# Patient Record
Sex: FEMALE | NOT HISPANIC OR LATINO | URBAN - METROPOLITAN AREA
[De-identification: names, ages, dates, MRNs, and addresses within clinical notes are randomized per-mention and may not be internally consistent; named-entity substitution may affect disease eponyms.]

---

## 2021-12-16 ENCOUNTER — IMPORTED ENCOUNTER (OUTPATIENT)
Dept: URBAN - METROPOLITAN AREA CLINIC 31 | Facility: CLINIC | Age: 81
End: 2021-12-16

## 2021-12-16 PROBLEM — H40.1131: Noted: 2021-12-16

## 2021-12-16 PROBLEM — Z96.1: Noted: 2021-12-16

## 2021-12-16 PROCEDURE — 99204 OFFICE O/P NEW MOD 45 MIN: CPT

## 2021-12-16 PROCEDURE — 92015 DETERMINE REFRACTIVE STATE: CPT

## 2021-12-16 NOTE — PATIENT DISCUSSION
1.  Doing well with MFIOL. Monitor for changes in vision. 2. Primary open angle glaucoma OU mild - Acceptable IOPs. Was being followed by Dr. Ana Caldera. Continue with current treatment plan. Discussed importance of compliance. Will continue to monitor for stability or progression. 3. Return for an appointment in 6 months for comprehensive exam and VF 24-2. with Dr. Carlos Bess.

## 2022-01-25 ENCOUNTER — IMPORTED ENCOUNTER (OUTPATIENT)
Dept: URBAN - METROPOLITAN AREA CLINIC 31 | Facility: CLINIC | Age: 82
End: 2022-01-25

## 2022-01-25 PROBLEM — H40.1131: Noted: 2022-01-25

## 2022-01-25 PROBLEM — Z96.1: Noted: 2022-01-25

## 2022-01-25 PROBLEM — H02.052: Noted: 2022-01-25

## 2022-01-25 PROBLEM — H02.053: Noted: 2022-01-25

## 2022-01-25 PROCEDURE — 99214 OFFICE O/P EST MOD 30 MIN: CPT

## 2022-01-25 PROCEDURE — 67820 REVISE EYELASHES: CPT

## 2022-01-25 NOTE — PATIENT DISCUSSION
1.  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 Epilation with  forceps was recommended. AT gel 3-4XD. 2. Doing well with MFIOL. Monitor for changes in vision. 3. Primary open angle glaucoma OU mild - Acceptable IOPs. Was being followed by Dr. Ana Caldera. Continue with current treatment plan. Discussed importance of compliance. Will continue to monitor for stability or progression. 4. Return for an appointment in 6 months for comprehensive exam and VF 24-2. with Dr. Carlos Bess.

## 2022-01-25 NOTE — PATIENT DISCUSSION
1.  Doing well with MFIOL. Monitor for changes in vision. 2. Primary open angle glaucoma OU mild - Acceptable IOPs. Was being followed by Dr. John Rodriguez. Continue with current treatment plan. Discussed importance of compliance. Will continue to monitor for stability or progression. 3. Return for an appointment in 6 months for comprehensive exam and VF 24-2. with Dr. Karina Amezcua.

## 2022-04-02 ASSESSMENT — VISUAL ACUITY
OS_SC: 20/25
OS_PH: SC 20/30 +2
OD_GLARE: 20/400MED
OD_PH: SC 20/25 -3
OS_GLARE: 20/800MED
OS_CC: 20/40+1
OD_SC: 20/25
OD_CC: 20/80+1
OD_SC: 20/20-1
OS_SC: 20/20-1

## 2022-04-02 ASSESSMENT — TONOMETRY
OD_IOP_MMHG: 20
OD_IOP_MMHG: 18
OS_IOP_MMHG: 17
OS_IOP_MMHG: 17

## 2022-04-06 NOTE — PATIENT DISCUSSION
Discussed the importance of blood sugar control in the prevention of ocular complications. Reason for Call:  Other call back    Detailed comments: Pt stated that she has needed HA for a long time and now she wants them.  Pt wants to get it ASAP and states that it is essential. Please call pt and let her know if she is essential or not.   Phone Number Patient can be reached at: Home number on file [phone] (home)    Best Time: NA    Can we leave a detailed message on this number? YES    Call taken on 5/20/2020 at 10:42 AM by Michelle Mendez

## 2022-06-16 ENCOUNTER — COMPREHENSIVE EXAM (OUTPATIENT)
Dept: URBAN - METROPOLITAN AREA CLINIC 29 | Facility: CLINIC | Age: 82
End: 2022-06-16

## 2022-06-16 DIAGNOSIS — H40.1131: ICD-10-CM

## 2022-06-16 PROCEDURE — 92083 EXTENDED VISUAL FIELD XM: CPT

## 2022-06-16 PROCEDURE — 99214 OFFICE O/P EST MOD 30 MIN: CPT

## 2022-06-16 ASSESSMENT — TONOMETRY
OD_IOP_MMHG: 12
OS_IOP_MMHG: 13

## 2022-06-16 ASSESSMENT — VISUAL ACUITY
OD_CC: 20/25
OS_CC: 20/25

## 2022-06-16 NOTE — PATIENT DISCUSSION
1.  Doing well with MFIOL. Monitor for changes in vision. 2. Primary open angle glaucoma OU mild - Acceptable IOPs. Was being followed by Dr. Mojgan Liao. Continue with current treatment plan. Discussed importance of compliance. Will continue to monitor for stability or progression. 3. Return for an appointment in 6 months for comprehensive exam and VF 24-2. with Dr. Gage Gonzalez.

## 2022-06-16 NOTE — PATIENT DISCUSSION
1.  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 Epilation with  forceps was recommended. AT gel 3-4XD. 2. Doing well with MFIOL. Monitor for changes in vision. 3. Primary open angle glaucoma OU mild - Acceptable IOPs. Was being followed by Dr. Andi Delaney. Continue with current treatment plan. Discussed importance of compliance. Will continue to monitor for stability or progression. 4. Return for an appointment in 6 months for comprehensive exam and VF 24-2. with Dr. Raven Rea.

## 2022-06-16 NOTE — PATIENT DISCUSSION
Procedure note: Informed consent obtained. Abnormal lashes epilated with forceps without difficulty.

## 2022-08-09 ENCOUNTER — EMERGENCY VISIT (OUTPATIENT)
Dept: URBAN - METROPOLITAN AREA CLINIC 29 | Facility: CLINIC | Age: 82
End: 2022-08-09

## 2022-08-09 DIAGNOSIS — H04.123: ICD-10-CM

## 2022-08-09 DIAGNOSIS — H16.122: ICD-10-CM

## 2022-08-09 PROCEDURE — 99214 OFFICE O/P EST MOD 30 MIN: CPT

## 2022-08-09 RX ORDER — LOTEPREDNOL ETABONATE 3.8 MG/G: 1 GEL OPHTHALMIC

## 2022-08-09 ASSESSMENT — VISUAL ACUITY
OS_CC: 20/25
OD_CC: 20/25

## 2022-08-17 ENCOUNTER — ESTABLISHED PATIENT (OUTPATIENT)
Dept: URBAN - METROPOLITAN AREA CLINIC 29 | Facility: CLINIC | Age: 82
End: 2022-08-17

## 2022-08-17 DIAGNOSIS — H04.123: ICD-10-CM

## 2022-08-17 DIAGNOSIS — H40.1131: ICD-10-CM

## 2022-08-17 DIAGNOSIS — H16.122: ICD-10-CM

## 2022-08-17 PROCEDURE — 99213 OFFICE O/P EST LOW 20 MIN: CPT

## 2022-08-17 ASSESSMENT — VISUAL ACUITY
OD_CC: 20/25
OS_CC: 20/25

## 2022-08-17 ASSESSMENT — TONOMETRY
OS_IOP_MMHG: 16
OD_IOP_MMHG: 21

## 2022-08-27 NOTE — PATIENT DISCUSSION
The IOP is above the target range OD. Check IOP 6 weeks. May be steroid response. Pf[employer] dose 1 and 2

## 2022-10-18 ENCOUNTER — ESTABLISHED PATIENT (OUTPATIENT)
Dept: URBAN - METROPOLITAN AREA CLINIC 29 | Facility: CLINIC | Age: 82
End: 2022-10-18

## 2022-10-18 DIAGNOSIS — H04.123: ICD-10-CM

## 2022-10-18 DIAGNOSIS — H40.1131: ICD-10-CM

## 2022-10-18 PROCEDURE — 99213 OFFICE O/P EST LOW 20 MIN: CPT

## 2022-10-18 ASSESSMENT — TONOMETRY
OS_IOP_MMHG: 12
OD_IOP_MMHG: 18

## 2022-10-18 ASSESSMENT — VISUAL ACUITY
OS_CC: 20/25+1
OD_CC: 20/20-2

## 2022-11-18 ENCOUNTER — ESTABLISHED PATIENT (OUTPATIENT)
Dept: URBAN - METROPOLITAN AREA CLINIC 29 | Facility: CLINIC | Age: 82
End: 2022-11-18

## 2022-11-18 DIAGNOSIS — H40.1131: ICD-10-CM

## 2022-11-18 DIAGNOSIS — H04.123: ICD-10-CM

## 2022-11-18 DIAGNOSIS — H16.122: ICD-10-CM

## 2022-11-18 PROCEDURE — 99213 OFFICE O/P EST LOW 20 MIN: CPT

## 2022-11-18 ASSESSMENT — VISUAL ACUITY
OD_CC: 20/20
OS_CC: 20/25

## 2022-11-18 ASSESSMENT — TONOMETRY
OD_IOP_MMHG: 18
OS_IOP_MMHG: 12

## 2022-11-18 NOTE — PATIENT DISCUSSION
Continue Zioptan HS OU. Discontinue Alphagan to see if responsible for some surface issues. Check IOP in 2-3 weeks. If IOP increases but surface improves, try Vyzulta or consider SLT.

## 2022-12-12 ENCOUNTER — ESTABLISHED PATIENT (OUTPATIENT)
Dept: URBAN - METROPOLITAN AREA CLINIC 29 | Facility: CLINIC | Age: 82
End: 2022-12-12

## 2022-12-12 PROCEDURE — 99214 OFFICE O/P EST MOD 30 MIN: CPT

## 2022-12-12 ASSESSMENT — VISUAL ACUITY
OD_CC: 20/25
OS_CC: 20/25

## 2022-12-12 ASSESSMENT — TONOMETRY
OS_IOP_MMHG: 16
OD_IOP_MMHG: 28

## 2022-12-20 ENCOUNTER — ESTABLISHED PATIENT (OUTPATIENT)
Dept: URBAN - METROPOLITAN AREA CLINIC 29 | Facility: CLINIC | Age: 82
End: 2022-12-20

## 2022-12-20 PROCEDURE — 99213 OFFICE O/P EST LOW 20 MIN: CPT

## 2022-12-20 ASSESSMENT — TONOMETRY
OS_IOP_MMHG: 16
OD_IOP_MMHG: 20

## 2022-12-20 ASSESSMENT — VISUAL ACUITY
OS_SC: 20/40+2
OD_SC: 20/70-2
OD_PH: 20/30-2

## 2023-09-21 ENCOUNTER — FOLLOW UP (OUTPATIENT)
Dept: URBAN - METROPOLITAN AREA CLINIC 29 | Facility: CLINIC | Age: 83
End: 2023-09-21

## 2023-09-21 DIAGNOSIS — H40.1131: ICD-10-CM

## 2023-09-21 PROCEDURE — 99214 OFFICE O/P EST MOD 30 MIN: CPT

## 2023-09-21 PROCEDURE — 92133 CPTRZD OPH DX IMG PST SGM ON: CPT

## 2023-09-21 ASSESSMENT — TONOMETRY
OD_IOP_MMHG: 23
OS_IOP_MMHG: 16

## 2023-09-21 ASSESSMENT — VISUAL ACUITY
OS_SC: 20/25-2
OD_SC: 20/30

## 2023-10-10 ENCOUNTER — FOLLOW UP (OUTPATIENT)
Dept: URBAN - METROPOLITAN AREA CLINIC 29 | Facility: CLINIC | Age: 83
End: 2023-10-10

## 2023-10-10 DIAGNOSIS — H40.1131: ICD-10-CM

## 2023-10-10 DIAGNOSIS — H01.113: ICD-10-CM

## 2023-10-10 DIAGNOSIS — H01.116: ICD-10-CM

## 2023-10-10 DIAGNOSIS — H04.123: ICD-10-CM

## 2023-10-10 PROCEDURE — 99214 OFFICE O/P EST MOD 30 MIN: CPT

## 2023-10-10 ASSESSMENT — VISUAL ACUITY
OD_CC: 20/30-2
OS_CC: 20/30

## 2023-10-10 ASSESSMENT — TONOMETRY
OS_IOP_MMHG: 17
OD_IOP_MMHG: 20

## 2023-10-31 ENCOUNTER — FOLLOW UP (OUTPATIENT)
Dept: URBAN - METROPOLITAN AREA CLINIC 29 | Facility: CLINIC | Age: 83
End: 2023-10-31

## 2023-10-31 DIAGNOSIS — H01.113: ICD-10-CM

## 2023-10-31 DIAGNOSIS — H01.116: ICD-10-CM

## 2023-10-31 DIAGNOSIS — H40.1131: ICD-10-CM

## 2023-10-31 PROCEDURE — 99213 OFFICE O/P EST LOW 20 MIN: CPT

## 2023-10-31 ASSESSMENT — TONOMETRY
OD_IOP_MMHG: 22
OS_IOP_MMHG: 16

## 2023-10-31 ASSESSMENT — VISUAL ACUITY
OS_CC: 20/20
OD_CC: 20/40
OD_PH: 20/30

## 2023-12-13 ENCOUNTER — FOLLOW UP (OUTPATIENT)
Dept: URBAN - METROPOLITAN AREA CLINIC 29 | Facility: CLINIC | Age: 83
End: 2023-12-13

## 2023-12-13 DIAGNOSIS — H40.1131: ICD-10-CM

## 2023-12-13 PROCEDURE — 99213 OFFICE O/P EST LOW 20 MIN: CPT

## 2023-12-13 ASSESSMENT — TONOMETRY
OD_IOP_MMHG: 22
OS_IOP_MMHG: 16

## 2023-12-13 ASSESSMENT — VISUAL ACUITY
OS_CC: 20/25-1
OD_CC: 20/30+2

## 2024-02-09 ENCOUNTER — CONSULTATION/EVALUATION (OUTPATIENT)
Dept: URBAN - METROPOLITAN AREA CLINIC 29 | Facility: CLINIC | Age: 84
End: 2024-02-09

## 2024-02-09 DIAGNOSIS — H40.1112: ICD-10-CM

## 2024-02-09 DIAGNOSIS — H43.811: ICD-10-CM

## 2024-02-09 DIAGNOSIS — H04.123: ICD-10-CM

## 2024-02-09 DIAGNOSIS — H40.1121: ICD-10-CM

## 2024-02-09 DIAGNOSIS — Z96.1: ICD-10-CM

## 2024-02-09 DIAGNOSIS — G45.3: ICD-10-CM

## 2024-02-09 DIAGNOSIS — H02.051: ICD-10-CM

## 2024-02-09 PROCEDURE — 99214 OFFICE O/P EST MOD 30 MIN: CPT

## 2024-02-09 PROCEDURE — 92083 EXTENDED VISUAL FIELD XM: CPT

## 2024-02-09 PROCEDURE — 76514 ECHO EXAM OF EYE THICKNESS: CPT

## 2024-02-09 ASSESSMENT — PACHYMETRY
OS_CT_UM: 505
OD_CT_UM: 511

## 2024-02-09 ASSESSMENT — VISUAL ACUITY
OD_CC: 20/30
OS_CC: 20/25-2

## 2024-02-09 ASSESSMENT — TONOMETRY
OS_IOP_MMHG: 18
OD_IOP_MMHG: 29

## 2024-02-20 ENCOUNTER — SURGERY/PROCEDURE (OUTPATIENT)
Dept: URBAN - METROPOLITAN AREA SURGERY 17 | Facility: SURGERY | Age: 84
End: 2024-02-20

## 2024-02-20 DIAGNOSIS — H40.1112: ICD-10-CM

## 2024-02-20 PROCEDURE — 65855 TRABECULOPLASTY LASER SURG: CPT

## 2024-03-04 ENCOUNTER — ESTABLISHED PATIENT (OUTPATIENT)
Dept: URBAN - METROPOLITAN AREA CLINIC 29 | Facility: CLINIC | Age: 84
End: 2024-03-04

## 2024-03-04 DIAGNOSIS — H40.1112: ICD-10-CM

## 2024-03-04 DIAGNOSIS — H40.1121: ICD-10-CM

## 2024-03-04 DIAGNOSIS — Z98.890: ICD-10-CM

## 2024-03-04 PROCEDURE — 92012 INTRM OPH EXAM EST PATIENT: CPT

## 2024-03-04 ASSESSMENT — VISUAL ACUITY
OD_CC: 20/40+1
OD_PH: 20/30
OS_CC: 20/30+2

## 2024-03-04 ASSESSMENT — TONOMETRY
OD_IOP_MMHG: 34
OS_IOP_MMHG: 18

## 2024-04-05 ENCOUNTER — FOLLOW UP (OUTPATIENT)
Dept: URBAN - METROPOLITAN AREA CLINIC 29 | Facility: CLINIC | Age: 84
End: 2024-04-05

## 2024-04-05 DIAGNOSIS — H40.1121: ICD-10-CM

## 2024-04-05 DIAGNOSIS — H40.1112: ICD-10-CM

## 2024-04-05 DIAGNOSIS — Z98.890: ICD-10-CM

## 2024-04-05 PROCEDURE — 99213 OFFICE O/P EST LOW 20 MIN: CPT

## 2024-04-05 ASSESSMENT — VISUAL ACUITY
OS_CC: 20/25-2
OD_CC: 20/25-2

## 2024-04-05 ASSESSMENT — TONOMETRY
OD_IOP_MMHG: 23
OS_IOP_MMHG: 20

## 2024-06-17 ENCOUNTER — FOLLOW UP (OUTPATIENT)
Dept: URBAN - METROPOLITAN AREA CLINIC 29 | Facility: CLINIC | Age: 84
End: 2024-06-17

## 2024-06-17 DIAGNOSIS — H40.1121: ICD-10-CM

## 2024-06-17 DIAGNOSIS — H40.1112: ICD-10-CM

## 2024-06-17 PROCEDURE — 99214 OFFICE O/P EST MOD 30 MIN: CPT

## 2024-06-17 ASSESSMENT — VISUAL ACUITY
OS_CC: 20/40+2
OD_PH: 20/30+2
OS_PH: 20/30+2
OD_CC: 20/40+2

## 2024-06-17 ASSESSMENT — TONOMETRY
OD_IOP_MMHG: 32
OS_IOP_MMHG: 20

## 2024-07-08 ENCOUNTER — FOLLOW UP (OUTPATIENT)
Dept: URBAN - METROPOLITAN AREA CLINIC 29 | Facility: CLINIC | Age: 84
End: 2024-07-08

## 2024-07-08 DIAGNOSIS — H40.1112: ICD-10-CM

## 2024-07-08 DIAGNOSIS — Z98.890: ICD-10-CM

## 2024-07-08 DIAGNOSIS — H40.1121: ICD-10-CM

## 2024-07-08 PROCEDURE — 99213 OFFICE O/P EST LOW 20 MIN: CPT

## 2024-07-08 ASSESSMENT — VISUAL ACUITY
OS_CC: 20/40+2
OD_CC: 20/40

## 2024-07-08 ASSESSMENT — TONOMETRY
OD_IOP_MMHG: 17
OS_IOP_MMHG: 16

## 2024-08-19 ENCOUNTER — FOLLOW UP (OUTPATIENT)
Dept: URBAN - METROPOLITAN AREA CLINIC 29 | Facility: CLINIC | Age: 84
End: 2024-08-19

## 2024-08-19 DIAGNOSIS — H52.13: ICD-10-CM

## 2024-08-19 DIAGNOSIS — H52.223: ICD-10-CM

## 2024-08-19 DIAGNOSIS — H40.1121: ICD-10-CM

## 2024-08-19 DIAGNOSIS — H52.4: ICD-10-CM

## 2024-08-19 DIAGNOSIS — H40.1112: ICD-10-CM

## 2024-08-19 PROCEDURE — 99214 OFFICE O/P EST MOD 30 MIN: CPT

## 2024-08-19 PROCEDURE — 92015 DETERMINE REFRACTIVE STATE: CPT

## 2024-08-19 RX ORDER — TIMOLOL MALEATE 2.5 MG/ML
1 SOLUTION OPHTHALMIC EVERY MORNING
Start: 2024-08-19

## 2024-08-19 ASSESSMENT — TONOMETRY
OS_IOP_MMHG: 19
OD_IOP_MMHG: 24

## 2024-08-19 ASSESSMENT — VISUAL ACUITY
OD_CC: 20/40
OS_CC: 20/30

## 2024-09-17 ENCOUNTER — FOLLOW UP (OUTPATIENT)
Dept: URBAN - METROPOLITAN AREA CLINIC 29 | Facility: CLINIC | Age: 84
End: 2024-09-17

## 2024-09-17 DIAGNOSIS — H40.1112: ICD-10-CM

## 2024-09-17 DIAGNOSIS — H40.1121: ICD-10-CM

## 2024-09-17 PROCEDURE — 99213 OFFICE O/P EST LOW 20 MIN: CPT

## 2025-01-17 ENCOUNTER — FOLLOW UP (OUTPATIENT)
Age: 85
End: 2025-01-17

## 2025-01-17 DIAGNOSIS — H40.1112: ICD-10-CM

## 2025-01-17 DIAGNOSIS — Z96.1: ICD-10-CM

## 2025-01-17 DIAGNOSIS — H40.1121: ICD-10-CM

## 2025-01-17 PROCEDURE — 92083 EXTENDED VISUAL FIELD XM: CPT

## 2025-01-17 PROCEDURE — 99214 OFFICE O/P EST MOD 30 MIN: CPT

## 2025-06-17 ENCOUNTER — FOLLOW UP (OUTPATIENT)
Age: 85
End: 2025-06-17

## 2025-06-17 DIAGNOSIS — H40.1112: ICD-10-CM

## 2025-06-17 DIAGNOSIS — H40.1121: ICD-10-CM

## 2025-06-17 PROCEDURE — 99213 OFFICE O/P EST LOW 20 MIN: CPT

## 2025-06-17 PROCEDURE — 92083 EXTENDED VISUAL FIELD XM: CPT

## 2025-07-30 ENCOUNTER — COMPREHENSIVE EXAM (OUTPATIENT)
Age: 85
End: 2025-07-30

## 2025-07-30 DIAGNOSIS — H16.143: ICD-10-CM

## 2025-07-30 DIAGNOSIS — H16.223: ICD-10-CM

## 2025-07-30 DIAGNOSIS — H35.363: ICD-10-CM

## 2025-07-30 DIAGNOSIS — H40.1112: ICD-10-CM

## 2025-07-30 DIAGNOSIS — H40.1121: ICD-10-CM

## 2025-07-30 DIAGNOSIS — Z96.1: ICD-10-CM

## 2025-07-30 PROCEDURE — 92250 FUNDUS PHOTOGRAPHY W/I&R: CPT

## 2025-07-30 PROCEDURE — 92134 CPTRZ OPH DX IMG PST SGM RTA: CPT

## 2025-07-30 PROCEDURE — 99214 OFFICE O/P EST MOD 30 MIN: CPT
